# Patient Record
Sex: FEMALE | Race: WHITE
[De-identification: names, ages, dates, MRNs, and addresses within clinical notes are randomized per-mention and may not be internally consistent; named-entity substitution may affect disease eponyms.]

---

## 2017-02-03 ENCOUNTER — HOSPITAL ENCOUNTER (EMERGENCY)
Dept: HOSPITAL 17 - PHEFT | Age: 57
Discharge: HOME | End: 2017-02-03
Payer: SELF-PAY

## 2017-02-03 VITALS
DIASTOLIC BLOOD PRESSURE: 60 MMHG | OXYGEN SATURATION: 97 % | HEART RATE: 87 BPM | RESPIRATION RATE: 16 BRPM | SYSTOLIC BLOOD PRESSURE: 110 MMHG | TEMPERATURE: 97.9 F

## 2017-02-03 VITALS — HEIGHT: 63 IN | BODY MASS INDEX: 35.94 KG/M2 | WEIGHT: 202.83 LBS

## 2017-02-03 DIAGNOSIS — J45.901: Primary | ICD-10-CM

## 2017-02-03 DIAGNOSIS — R09.89: ICD-10-CM

## 2017-02-03 PROCEDURE — 94664 DEMO&/EVAL PT USE INHALER: CPT

## 2017-02-03 PROCEDURE — 96372 THER/PROPH/DIAG INJ SC/IM: CPT

## 2017-02-03 PROCEDURE — 99283 EMERGENCY DEPT VISIT LOW MDM: CPT

## 2017-02-03 PROCEDURE — 94640 AIRWAY INHALATION TREATMENT: CPT

## 2017-02-03 PROCEDURE — 71020: CPT

## 2017-02-03 RX ADMIN — IPRATROPIUM BROMIDE AND ALBUTEROL SULFATE SCH AMPULE: .5; 3 SOLUTION RESPIRATORY (INHALATION) at 12:09

## 2017-02-03 RX ADMIN — IPRATROPIUM BROMIDE AND ALBUTEROL SULFATE SCH AMPULE: .5; 3 SOLUTION RESPIRATORY (INHALATION) at 12:11

## 2017-02-03 NOTE — PD
HPI


Chief Complaint:  Respiratory Symptoms


Time Seen by Provider:  12:00


Travel History


International Travel<30 days:  No


Contact w/Intl Traveler<30days:  No


Traveled to known affect area:  No





History of Present Illness


HPI


Patient is a 57-year-old female who presents emergency for evaluation of a 

cough and chest congestion.  Patient states her symptoms have been ongoing for 

approximately 2 months.  She denies any fever, chills, nausea, vomiting, 

headache, shortness of breath.  She does endorse a history of asthma and has 

been out of her inhalers and nebulizers for some time.  She is attempting to 

establish with a clinic but does not have a primary doctor at this time.  She 

does not use tobacco products.  She has a remote history of tobacco use at age 

17.





PFSH


Past Medical History


Asthma:  Yes


Anxiety:  Yes


Depression:  Yes


Heart Rhythm Problems:  No


Cardiac Catheterization:  No


Cardiovascular Problems:  No


High Cholesterol:  No


Congestive Heart Failure:  No


Diabetes:  No


Diminished Hearing:  No


Gastrointestinal Disorders:  Yes (LESION IN HER LIVER- TOLD IT WAS BENIGN)


Hypertension:  No


Musculoskeletal:  Yes (CHRONIC SCIATICA )


Immunizations Current:  Yes


Pneumonia:  Yes


PNEUMOCCOCAL Vaccine (Year):  3


Pregnant?:  Not Pregnant


Menopausal:  Yes


:  7


Para:  4


Miscarriage:  3





Past Surgical History


 Section:  Yes (X4)


Coronary Artery Bypass Graft:  No


Hysterectomy:  Yes


Other Surgery:  Yes (BREAST AUGMENTATION)





Family History


Family Myocardial Infarction:  Yes (FATHER GOT A VALVE)





Social History


Alcohol Use:  Yes (OCC.)


Tobacco Use:  No


Substance Use:  No (DENIES)





Allergies-Medications


(Allergen,Severity, Reaction):  


Coded Allergies:  


     Dust (Verified  Allergy, Severe, Wheezing, 2/3/17)


     Pollen Extract (Verified  Allergy, Severe, Wheezing, 2/3/17)


     Sulfa (Verified  Allergy, Severe, Anaphylaxis, 2/3/17)


Reported Meds & Prescriptions





Reported Meds & Active Scripts


Active


Reported


Duoneb (Ipratropium-Albuterol Neb) 0.5-2.5 Mg/3 Ml Neb 1 Nebule INH DAILY


Proventil Hfa 6.7 GM Inh (Albuterol Sulfate) 90 Mcg/Act Aer 1 Puff INH Q4H PRN








Review of Systems


Except as stated in HPI:  all other systems reviewed are Neg


HENT:  No: Headaches


Cardiovascular:  No: Chest Pain or Discomfort


Respiratory:  Positive: Cough, Wheezing


Gastrointestinal:  No: Nausea, Abdominal Pain


Musculoskeletal:  No: Myalgias





Physical Exam


Narrative


GENERAL: Well-nourished, well-developed patient.


SKIN: Warm and dry.


HEAD: Normocephalic.


EYES: No scleral icterus. No injection or drainage. 


NECK: Supple, trachea midline. No JVD or lymphadenopathy.


CARDIOVASCULAR: Regular rate and rhythm without murmurs, gallops, or rubs. 


RESPIRATORY: Breath sounds equal bilaterally, expiratory wheezing noted 

throughout lung fields.  No increased work of breathing noted, no nasal flaring

, no retractions.


GASTROINTESTINAL: Abdomen soft, non-tender, nondistended. 


MUSCULOSKELETAL: No cyanosis, or edema. 


BACK: Nontender without obvious deformity. No CVA tenderness.





Data


Data


Last Documented VS





Vital Signs








  Date Time  Temp Pulse Resp B/P Pulse Ox O2 Delivery O2 Flow Rate FiO2


 


2/3/17 11:52 97.9 87 16 110/60 97   








Orders





 Chest, Pa & Lat (2/3/17 11:59)


Methylprednisolone So Succ Inj (Solumedr (2/3/17 12:00)


Albuterol-Ipratropium Neb (Duoneb Neb) (2/3/17 12:00)


Budesonide Neb (Pulmicort Respule Neb) (2/3/17 12:00)








MDM


Medical Decision Making


Medical Screen Exam Complete:  Yes


Emergency Medical Condition:  Yes


Interpretation(s)





Vital Signs








  Date Time  Temp Pulse Resp B/P Pulse Ox O2 Delivery O2 Flow Rate FiO2


 


2/3/17 11:52 97.9 87 16 110/60 97   








Differential Diagnosis


Asthma exacerbation versus pneumonia versus bronchitis versus COPD versus viral 

URI versus other


Narrative Course


Patient is a 57-year-old female who presented to her chart for evaluation of a 

cough that has been ongoing for approximately 2 months.  Patient is out of her 

inhalers.  Physical examination revealed expiratory wheezing noted throughout 

lung fields.  Chest x-ray, Solu-Medrol, DuoNeb and budesonide ordered.  Stable, 

she is afebrile.  She does report completing a Z-Andrey on  with improvement 

in her symptoms.


Lung sounds are markedly improved after administration of nebulizer treatments.

  Patient will be provided with prescriptions for home medications.  She is 

encouraged to establish care with a primary doctor at [facility].

  She was encouraged to return to emergency department for any new or worsening 

symptoms.  Patient verbalized understanding of instructions.  Patient stable 

for discharge.





Diagnosis





 Primary Impression:  


 Asthma exacerbation


Referrals:  


[facility]





Primary Care Physician


Patient Instructions:  Asthma (ED), General Instructions





***Additional Instructions:


Follow-up with your primary doctor


Return to emergency department for any new or worsening symptoms


Use medications as directed


***Med/Other Pt SpecificInfo:  Prescription(s) given


Scripts


Prednisone 50 Mg Tab50 Mg PO DAILY  #5 TAB  Ref 0


   Prov:Maryam Hugo         2/3/17 


Montelukast (Singulair)10 Mg Tab10 Mg PO HS  #30 TAB  Ref 0


   Prov:Maryam Hugo         2/3/17 


Budesonide Neb 0.5 Mg/2 Ml Neb0.5 Mg NEB DAILY NEB  30 Days  Ref 0


   Prov:Maryam Hugo         2/3/17 


Albuterol 18 GM Inh (Ventolin Hfa 18 GM Inh)90 Mcg/Act Aer2 Puff INH Q4-6H PRN (

SHORTNESS OF BREATH) #1 INHALER  Ref 0


   Prov:Maryam Hugo         2/3/17 


Albuterol Neb 2.5 Mg/3 Ml Neb2.5 Mg NEB Q4HR NEB PRN (SHORTNESS OF BREATH) 30 

Days  Ref 0


   Prov:Maryam Hugo         2/3/17


Disposition:  01 DISCHARGE HOME


Condition:  Stable








Maryam Hugo Feb 3, 2017 12:17

## 2017-02-03 NOTE — RADHPO
EXAM DATE/TIME:  02/03/2017 12:31 

 

[facility] COMPARISON:     

CHEST PA & LAT, May 10, 2015, 10:39.

 

                     

INDICATIONS :     

Short of breath

                     

 

MEDICAL HISTORY :            

Asthma   

 

SURGICAL HISTORY :     

None.   

 

ENCOUNTER:     

Initial                                        

 

ACUITY:     

2 months      

 

PAIN SCORE:     

0/10

 

LOCATION:     

Bilateral chest 

 

FINDINGS:     

PA and lateral views of the chest demonstrate the lungs to be symmetrically aerated without evidence 
of mass, infiltrate or effusion.  The cardiomediastinal contours are unremarkable.  Osseous structure
s are intact.

 

CONCLUSION:     No acute disease.  

 

 

 

 Marek Lai MD FACR on February 03, 2017 at 12:55           

Board Certified Radiologist.

 This report was verified electronically.

## 2017-03-23 ENCOUNTER — HOSPITAL ENCOUNTER (EMERGENCY)
Dept: HOSPITAL 17 - PHED | Age: 57
Discharge: HOME | End: 2017-03-23
Payer: COMMERCIAL

## 2017-03-23 VITALS
TEMPERATURE: 99 F | DIASTOLIC BLOOD PRESSURE: 57 MMHG | OXYGEN SATURATION: 96 % | RESPIRATION RATE: 18 BRPM | SYSTOLIC BLOOD PRESSURE: 116 MMHG | HEART RATE: 84 BPM

## 2017-03-23 VITALS — HEIGHT: 63 IN | BODY MASS INDEX: 35.74 KG/M2 | WEIGHT: 201.72 LBS

## 2017-03-23 DIAGNOSIS — J30.1: ICD-10-CM

## 2017-03-23 DIAGNOSIS — J45.20: Primary | ICD-10-CM

## 2017-03-23 PROCEDURE — 99283 EMERGENCY DEPT VISIT LOW MDM: CPT

## 2017-03-23 NOTE — PD
HPI


.


cough for 1 day


Chief Complaint:  Cold / Flu Symptoms


Time Seen by Provider:  13:51


Travel History


International Travel<30 days:  No


Contact w/Intl Traveler<30days:  No


Traveled to known affect area:  No





History of Present Illness


HPI


57-year-old female here complaining of coughing for one day.  Patient does have 

a history of asthma and decided to come in early.  She was seen in February 

with similar issues and given medications, but her cough seems to be persisting 

through using inhalers.  She was told to establish at the [facility] and apparently there was some issues with her obtaining proper 

documentation from her previous employers and she has not yet been approved.  

She tells me she should be able to establish the clinic in the next few months.

  She thinks she may have had a fever.  At this present time her vital signs 

are stable.  She denies any sore throat, congestion, or rhinorrhea.





PFSH


Past Medical History


Asthma:  Yes


Anxiety:  Yes


Depression:  Yes


Heart Rhythm Problems:  No


Cardiac Catheterization:  No


Cardiovascular Problems:  No


High Cholesterol:  No


Congestive Heart Failure:  No


Diabetes:  No


Diminished Hearing:  No


Gastrointestinal Disorders:  Yes (LESION IN HER LIVER- TOLD IT WAS BENIGN)


Hypertension:  No


Musculoskeletal:  Yes (CHRONIC SCIATICA )


Immunizations Current:  Yes


Pneumonia:  Yes


PNEUMOCCOCAL Vaccine (Year):  3


Pregnant?:  Not Pregnant


Menopausal:  Yes


:  7


Para:  4


Miscarriage:  3





Past Surgical History


 Section:  Yes (X4)


Coronary Artery Bypass Graft:  No


Hysterectomy:  Yes


Other Surgery:  Yes (BREAST AUGMENTATION)





Social History


Alcohol Use:  Yes (OCC.)


Tobacco Use:  No


Substance Use:  No (DENIES)





Allergies-Medications


(Allergen,Severity, Reaction):  


Coded Allergies:  


     Dust (Verified  Allergy, Severe, Wheezing, 3/23/17)


     Pollen Extract (Verified  Allergy, Severe, Wheezing, 3/23/17)


     Sulfa (Verified  Allergy, Severe, Anaphylaxis, 3/23/17)


Reported Meds & Prescriptions





Reported Meds & Active Scripts


Active


Prednisone 50 Mg Tab 50 Mg PO DAILY


Ventolin Hfa 18 GM Inh (Albuterol Sulfate) 90 Mcg/Act Aer 2 Puff INH Q4-6H PRN


Albuterol Neb (Albuterol Sulfate) 2.5 Mg/3 Ml Neb 2.5 Mg NEB Q4HR NEB PRN 30 

Days


Reported


Proventil Hfa 6.7 GM Inh (Albuterol Sulfate) 90 Mcg/Act Aer 1 Puff INH Q4H PRN








Review of Systems


General / Constitutional:  No: Fever


Eyes:  No: Visual changes


HENT:  No: Headaches


Cardiovascular:  No: Chest Pain or Discomfort


Respiratory:  Positive: Cough,  No: Shortness of Breath, Wheezing


Gastrointestinal:  No: Abdominal Pain


Genitourinary:  No: Dysuria


Musculoskeletal:  No: Pain


Skin:  No Rash


Neurologic:  No: Weakness


Psychiatric:  No: Depression


Endocrine:  No: Polydipsia


Hematologic/Lymphatic:  No: Easy Bruising





Physical Exam


Narrative


GENERAL: AAO x 3, no acute distress, Well-nourished, well-developed patient.


SKIN: Warm and dry. No visible rashes or bruising. 


HEAD: Normocephalic and atraumatic.


EYES: No scleral icterus. No injection or drainage. 


ENT: No nasal drainage noted. Mucous membranes pink. Airway patent.  No 

posterior or varus erythema, edema or exudates.  TMs with fluid bubbles.


NECK: Supple, trachea midline. No JVD.


CARDIOVASCULAR: Regular rate and rhythm without murmurs, gallops, or rubs. 


RESPIRATORY: Breath sounds equally diminished bilaterally. No accessory muscle 

use. No rhonchi or rales.  Mild wheeze.  Dry cough heard throughout examination


GASTROINTESTINAL: Visual inspection normal


EXTREMITIES: No cyanosis or edema. 


BACK: Nontender without obvious deformity. No CVA tenderness.


PSYCH: AAO x 3, normal affect.





Data


Data


Last Documented VS





Vital Signs








  Date Time  Temp Pulse Resp B/P Pulse Ox O2 Delivery O2 Flow Rate FiO2


 


3/23/17 13:56   18  96 Room Air  


 


3/23/17 12:41 99.0 84  116/57    











MDM


Medical Decision Making


Medical Screen Exam Complete:  Yes


Emergency Medical Condition:  Yes


Medical Record Reviewed:  Yes


Differential Diagnosis


Bronchitis, asthma exacerbation, less likely pneumonia, less likely influenza


Narrative Course


57-year-old female here complaining of coughing for one day.  Patient does have 

a history of asthma and decided to come in early.  She was seen in February 

with similar issues and given medications, but her cough seems to be persisting 

through using inhalers.  She was told to establish at the [facility] and apparently there was some issues with her obtaining proper 

documentation from her previous employers and she has not yet been approved.  

She tells me she should be able to establish the clinic in the next few months.

  She thinks she may have had a fever.  At this present time her vital signs 

are stable.  She denies any sore throat, congestion, or rhinorrhea.





Patient seen and examined.  She seems to have a bronchitis.


She is not having an acute exacerbation of asthma.


She has been advised to continue her home medications.


I provided her short course of prednisone.


She will need to follow-up with a primary care doctor for an asthma action plan.


She will need some type of maintenance medication.





Discussed this with her.





Patient verbalized understanding of instructions, questions were answered, and 

thanked me for their care. I advised them if their condition worsens, please 

return to the nearest emergency room for further care.





Diagnosis





 Primary Impression:  


 Asthmatic bronchitis


 Qualified Code:  J45.20 - Asthmatic bronchitis, mild intermittent, 

uncomplicated


 Additional Impression:  


 Allergic rhinitis


 Qualified Code:  J30.1 - Seasonal allergic rhinitis due to pollen


Patient Instructions:  General Instructions





***Additional Instructions:


As we discussed the cough can last 6-8 weeks. 


Take medications as prescribed. 


If you are a smoker, try to quit. 


Follow up with your primary care provider. 


If you develop sudden onset or worsening of shortness or breath, please go to 

the nearest emergency room. 





As we discussed, please follow-up [facility].


You will need to be placed on a medication for asthma maintenance





Try an over the counter antihistamine such as claritin or zyrtec daily.


***Med/Other Pt SpecificInfo:  Prescription(s) given


Disposition:  01 DISCHARGE HOME


Condition:  Stable








Alana Arce Mar 23, 2017 13:53

## 2018-01-06 ENCOUNTER — HOSPITAL ENCOUNTER (EMERGENCY)
Dept: HOSPITAL 17 - PHEFT | Age: 58
Discharge: HOME | End: 2018-01-06
Payer: COMMERCIAL

## 2018-01-06 VITALS
SYSTOLIC BLOOD PRESSURE: 150 MMHG | TEMPERATURE: 98 F | OXYGEN SATURATION: 96 % | HEART RATE: 85 BPM | DIASTOLIC BLOOD PRESSURE: 72 MMHG | RESPIRATION RATE: 16 BRPM

## 2018-01-06 VITALS — HEIGHT: 63 IN | WEIGHT: 211.64 LBS | BODY MASS INDEX: 37.5 KG/M2

## 2018-01-06 DIAGNOSIS — S00.83XA: Primary | ICD-10-CM
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
## 2018-01-06 DIAGNOSIS — R07.81: ICD-10-CM

## 2018-01-06 DIAGNOSIS — F12.90: ICD-10-CM

## 2018-01-06 DIAGNOSIS — F32.9: ICD-10-CM

## 2018-01-06 DIAGNOSIS — J45.909: ICD-10-CM

## 2018-01-06 DIAGNOSIS — F41.9: ICD-10-CM

## 2018-01-06 PROCEDURE — 71046 X-RAY EXAM CHEST 2 VIEWS: CPT

## 2018-01-06 PROCEDURE — 70150 X-RAY EXAM OF FACIAL BONES: CPT

## 2018-01-06 PROCEDURE — 99284 EMERGENCY DEPT VISIT MOD MDM: CPT

## 2018-01-06 NOTE — PD
HPI


Chief Complaint:  Injury


Time Seen by Provider:  12:19


Travel History


International Travel<30 days:  No


Contact w/Intl Traveler<30days:  No


Traveled to known affect area:  No





History of Present Illness


HPI


Patient is a 57-year-old female presenting to emergency department for 

evaluation after a trip and fall yesterday at work.  Patient presents today 

with swelling and bruising to her left cheek, she complains of right lateral 

rib pain, right arm pain, upper back pain and left knee pain.  Patient states 

she was carrying a large tray of beers when the  stuck her leg out 

subsequently tripping her causing her to fall face first striking her left 

cheek on a garbage can.  She denies any loss of consciousness, nausea, vomiting

, shortness of breath, chest pain, abdominal pain, weakness or numbness in her 

extremities.  She states that she feels more sore today than she did yesterday 

after the accident occurred.  She took Lortab this morning that she had at home 

for previous injury.  Her symptoms improved somewhat with this medication.  

Symptom onset was gradual, again alleviated with pain medication, exacerbated 

with movement.  Pain is currently a 6 out of 10 and she states it's sore and 

aching.





PFSH


Past Medical History


Asthma:  Yes


Anxiety:  Yes


Depression:  Yes


Gastrointestinal Disorders:  Yes (LESION IN HER LIVER- TOLD IT WAS BENIGN)


Musculoskeletal:  Yes (CHRONIC SCIATICA )


Respiratory:  Yes (asthma)


Immunizations Current:  Yes


Pneumonia:  Yes


Tetanus Vaccination:  < 5 Years


PNEUMOCCOCAL Vaccine (Year):  3


Menopausal:  Yes


:  7


Para:  4


Miscarriage:  3





Past Surgical History


 Section:  Yes (X4)


Hysterectomy:  Yes


Other Surgery:  Yes (BREAST AUGMENTATION)





Social History


Alcohol Use:  Yes (OCC.)


Tobacco Use:  No


Substance Use:  Yes (pot)





Allergies-Medications


(Allergen,Severity, Reaction):  


Coded Allergies:  


     Sulfa (Sulfonamide Antibiotics) (Unverified  Allergy, Severe, Anaphylaxis, 

18)


     house dust (Unverified  Allergy, Severe, Wheezing, 18)


     pollen extracts (Unverified  Allergy, Severe, Wheezing, 18)


Reported Meds & Prescriptions





Reported Meds & Active Scripts


Active


Reported


Hydrocodone-Acetaminophen 5-325 mg Tab 1 Tab PO Q4H PRN


Cetirizine (Cetirizine HCl) 10 Mg Tab 10 Mg PO DAILY








Review of Systems


Except as stated in HPI:  all other systems reviewed are Neg


Eyes:  No: Blurred Vision


HENT:  No: Headaches


Cardiovascular:  No: Chest Pain or Discomfort


Respiratory:  No: Shortness of Breath


Gastrointestinal:  No: Nausea


Musculoskeletal:  Positive: Myalgias, Cramping, Edema


Skin:  Positive Change in Pigmentation





Physical Exam


Narrative


GENERAL: Overweight, well-developed, alert female.  Resting comfortably in no 

acute distress.


SKIN: Warm and dry.  Edema and faint ecchymosis noted to the left cheek.


HEAD: Atraumatic. Normocephalic. 


EYES: Pupils equal and round. No scleral icterus. No injection or drainage. 


ENT: No nasal bleeding or discharge.  Mucous membranes pink and moist.


NECK: Trachea midline. No JVD.  No cervical spine tenderness or step-off noted.

  Motion with flexion, extension and rotation.


CARDIOVASCULAR: Regular rate and rhythm.  


RESPIRATORY: No accessory muscle use. Clear to auscultation. Breath sounds 

equal bilaterally. 


GASTROINTESTINAL: Abdomen soft, non-tender, nondistended. Hepatic and splenic 

margins not palpable. 


MUSCULOSKELETAL: Extremities without clubbing, cyanosis, or edema. No obvious 

deformities.  Tenderness to palpation to right lateral rib cage.  Decreased 

range of motion with adduction of right arm secondary to the pain in the ribs.


NEUROLOGICAL: Awake and alert. No obvious cranial nerve deficits.  Motor 

grossly within normal limits. Five out of 5 muscle strength in the arms and 

legs.  Normal speech.


PSYCHIATRIC: Appropriate mood and affect; insight and judgment normal.





Data


Data


Last Documented VS





Vital Signs








  Date Time  Temp Pulse Resp B/P (MAP) Pulse Ox O2 Delivery O2 Flow Rate FiO2


 


18 11:47 98.0 85 16 150/72 (98) 96   








Orders





 Orders


Chest, Pa & Lat (18 )


Cyclobenzaprine (Flexeril) (18 12:30)


Facial Bones - Comp([id number]) (18 )








MDM


Medical Decision Making


Medical Screen Exam Complete:  Yes


Emergency Medical Condition:  Yes


Interpretation(s)





Vital Signs








  Date Time  Temp Pulse Resp B/P (MAP) Pulse Ox O2 Delivery O2 Flow Rate FiO2


 


18 11:47 98.0 85 16 150/72 (98) 96   








Differential Diagnosis


Muscle strain versus muscle spasm versus contusion versus fracture versus other


Narrative Course


Patient presented for evaluation 1 day after a trip and fall at work.  Patient'

s vital signs are stable, no focal deficits noted on exam.  We'll obtain x-rays 

of the facial bones and a chest x-ray.  Flexeril given.


1342-patient reassessed, she reports improvement in her pain after 

administration of Flexeril.


Imaging reviewed, chest x-ray which is read by the radiologist shows no acute 

disease, x-ray of the facial bones also shows no acute abnormalities.  Patient 

will be discharged home, she is encouraged to take medications as needed and as 

directed, continue range of motion exercises, apply warm heat to affected area, 

avoid exacerbating activities.  She is encouraged follow-up with her primary 

doctor return to emergency department any new or worsening symptoms.  Patient 

is stable for discharge.





Diagnosis





 Primary Impression:  


 Fall


 Qualified Codes:  W19.XXXA - Unspecified fall, initial encounter


 Additional Impressions:  


 Contusion of face


 Qualified Codes:  S00.83XA - Contusion of other part of head, initial encounter


 Rib pain on right side


Referrals:  


Primary Care Physician


Patient Instructions:  Chest Wall Pain (GEN), Facial Contusion (ED), General 

Instructions, Rib Contusion (ED)





***Additional Instructions:  


Follow-up with your primary doctor


Take medications as needed and as directed


Return to emergency department for any new or worsening symptoms


Apply warm heat to the affected area, continue range of motion exercises, avoid 

exacerbating activities, avoid bed rest.


***Med/Other Pt SpecificInfo:  Prescription(s) given


Scripts


Ibuprofen (Ibuprofen) 800 Mg Tab


800 MG PO Q6HR Y for PAIN, #40 TAB 0 Refills


   Prov: Maryam Hugo         18 


Cyclobenzaprine (Flexeril) 10 Mg Tab


10 MG PO TID Y for MUSCLE SPASM, #30 TAB 0 Refills


   Prov: Maryam Hugo         18


Disposition:  01 DISCHARGE HOME


Condition:  Stable











Maryam Hugo 2018 12:46

## 2018-01-06 NOTE — RADRPT
EXAM DATE/TIME:  01/06/2018 12:44 

 

[facility] COMPARISON:     

CHEST PA & LAT, February 03, 2017, 12:31.

 

                     

INDICATIONS :     

Rib pain.

                     

 

MEDICAL HISTORY :     

None.          

 

SURGICAL HISTORY :     

None.   

 

ENCOUNTER:     

Initial                                        

 

ACUITY:     

2 days      

 

PAIN SCORE:     

3/10

 

LOCATION:     

Bilateral chest 

 

FINDINGS:     

The cardiac silhouette is enlarged in transverse diameter. There is parenchymal scarring bilaterally.
 There is no  evidence of pneumonia. There is no evidence of acute fracture.  

 

CONCLUSION:     

1. Cardiomegaly. No acute pulmonary disease.

 

 

 

 Donta Comer MD on January 06, 2018 at 13:34           

Board Certified Radiologist.

 This report was verified electronically.

## 2018-01-06 NOTE — RADRPT
EXAM DATE/TIME:  01/06/2018 12:44 

 

[facility] COMPARISON:     

No previous studies available for comparison.

 

                     

INDICATIONS :     

Head injury.

                     

 

MEDICAL HISTORY :     

None.          

 

SURGICAL HISTORY :     

None.   

 

ENCOUNTER:     

Initial                                        

 

ACUITY:     

1 day      

 

PAIN SCORE:     

8/10

 

LOCATION:     

Left  Zygoma.

 

FINDINGS:     

Multiple views of the facial bones demonstrate no evidence of fracture.  The nasal bone is intact.  T
he zygomatic arches are intact.  The infraorbital rim is intact.  The maxillary sinus is clear withou
t air fluid level.  No radiopaque foreign bodies are seen.

 

CONCLUSION:     

1. Negative examination of the facial bones.

 

 

 

 Donta Comer MD on January 06, 2018 at 13:35           

Board Certified Radiologist.

 This report was verified electronically.